# Patient Record
Sex: FEMALE | Race: WHITE
[De-identification: names, ages, dates, MRNs, and addresses within clinical notes are randomized per-mention and may not be internally consistent; named-entity substitution may affect disease eponyms.]

---

## 2019-06-15 ENCOUNTER — HOSPITAL ENCOUNTER (EMERGENCY)
Dept: HOSPITAL 91 - E/R | Age: 29
LOS: 1 days | Discharge: HOME | End: 2019-06-16
Payer: COMMERCIAL

## 2019-06-15 ENCOUNTER — HOSPITAL ENCOUNTER (EMERGENCY)
Dept: HOSPITAL 10 - E/R | Age: 29
LOS: 1 days | Discharge: HOME | End: 2019-06-16
Payer: COMMERCIAL

## 2019-06-15 VITALS
HEIGHT: 63 IN | BODY MASS INDEX: 32.03 KG/M2 | HEIGHT: 63 IN | WEIGHT: 180.78 LBS | WEIGHT: 180.78 LBS | BODY MASS INDEX: 32.03 KG/M2

## 2019-06-15 DIAGNOSIS — R55: Primary | ICD-10-CM

## 2019-06-15 LAB
ADD MAN DIFF?: NO
ANION GAP: 8 (ref 5–13)
BASOPHIL #: 0 10^3/UL (ref 0–0.1)
BASOPHILS %: 0.4 % (ref 0–2)
BLOOD UREA NITROGEN: 16 MG/DL (ref 7–20)
CALCIUM: 9.2 MG/DL (ref 8.4–10.2)
CARBON DIOXIDE: 28 MMOL/L (ref 21–31)
CHLORIDE: 104 MMOL/L (ref 97–110)
CREATININE: 0.7 MG/DL (ref 0.44–1)
EOSINOPHILS #: 0.3 10^3/UL (ref 0–0.5)
EOSINOPHILS %: 3.2 % (ref 0–7)
GLUCOSE: 85 MG/DL (ref 70–220)
HEMATOCRIT: 38.9 % (ref 37–47)
HEMOGLOBIN: 12.7 G/DL (ref 12–16)
IMMATURE GRANS #M: 0.04 10^3/UL (ref 0–0.03)
IMMATURE GRANS % (M): 0.4 % (ref 0–0.43)
LYMPHOCYTES #: 3.4 10^3/UL (ref 0.8–2.9)
LYMPHOCYTES %: 37.9 % (ref 15–51)
MEAN CORPUSCULAR HEMOGLOBIN: 28.2 PG (ref 29–33)
MEAN CORPUSCULAR HGB CONC: 32.6 G/DL (ref 32–37)
MEAN CORPUSCULAR VOLUME: 86.4 FL (ref 82–101)
MEAN PLATELET VOLUME: 11 FL (ref 7.4–10.4)
MONOCYTE #: 0.9 10^3/UL (ref 0.3–0.9)
MONOCYTES %: 9.5 % (ref 0–11)
NEUTROPHIL #: 4.4 10^3/UL (ref 1.6–7.5)
NEUTROPHILS %: 48.6 % (ref 39–77)
NUCLEATED RED BLOOD CELLS #: 0 10^3/UL (ref 0–0)
NUCLEATED RED BLOOD CELLS%: 0 /100WBC (ref 0–0)
PLATELET COUNT: 203 10^3/UL (ref 140–415)
POTASSIUM: 4 MMOL/L (ref 3.5–5.1)
RED BLOOD COUNT: 4.5 10^6/UL (ref 4.2–5.4)
RED CELL DISTRIBUTION WIDTH: 12.6 % (ref 11.5–14.5)
SODIUM: 140 MMOL/L (ref 135–144)
TROPONIN-I: < 0.012 NG/ML (ref 0–0.12)
WHITE BLOOD COUNT: 9.1 10^3/UL (ref 4.8–10.8)

## 2019-06-15 PROCEDURE — 82962 GLUCOSE BLOOD TEST: CPT

## 2019-06-15 PROCEDURE — 80048 BASIC METABOLIC PNL TOTAL CA: CPT

## 2019-06-15 PROCEDURE — 70450 CT HEAD/BRAIN W/O DYE: CPT

## 2019-06-15 PROCEDURE — 85025 COMPLETE CBC W/AUTO DIFF WBC: CPT

## 2019-06-15 PROCEDURE — 99285 EMERGENCY DEPT VISIT HI MDM: CPT

## 2019-06-15 PROCEDURE — 81025 URINE PREGNANCY TEST: CPT

## 2019-06-15 PROCEDURE — 93005 ELECTROCARDIOGRAM TRACING: CPT

## 2019-06-15 PROCEDURE — 84484 ASSAY OF TROPONIN QUANT: CPT

## 2019-06-15 PROCEDURE — 36415 COLL VENOUS BLD VENIPUNCTURE: CPT

## 2019-06-15 RX ADMIN — THIAMINE HYDROCHLORIDE 1 MLS/HR: 100 INJECTION, SOLUTION INTRAMUSCULAR; INTRAVENOUS at 22:13

## 2019-06-15 NOTE — ERD
ER Documentation


Chief Complaint


Chief Complaint





STATES SYNCOPAL EPISODE 1 HR AGO





HPI


This is a 28-year-old female who presents to the emergency room with a possible 


syncopal episode.  The patient states that she has been under a significant 


amount of stress lately.  She has been working 13-hour days and is preparing for


a wedding in a week.  The patient states that she was sitting in the car playing


on her phone and she felt her arms go limp.  It was bilateral upper extremities.


 She felt her entire body was heavy and limp and she was aware of her 


surroundings but unable to respond.  This lasted for 1-2 moments and completely 


resolved.  The patient denied any prodrome of headache chest pain or shortness 


of breath.  No slurred speech.  Patient otherwise has no complaints.  She denies


any headache.





ROS


All systems reviewed and are negative except as per history of present illness.





Allergies


Allergies:  


Coded Allergies:  


     No Known Allergy (Unverified , 6/15/19)





PMhx/Soc


Medical and Surgical Hx:  pt denies Medical Hx, pt denies Surgical Hx


Hx Alcohol Use:  No


Hx Substance Use:  No


Hx Tobacco Use:  No


Smoking Status:  Never smoker





FmHx


Family History:  No diabetes





Physical Exam


Vitals





Vital Signs


  Date      Temp  Pulse  Resp  B/P (MAP)   Pulse Ox  O2          O2 Flow    FiO2


Time                                                 Delivery    Rate


   6/15/19  98.3     85    19      111/70        99


     21:59                           (84)


   6/15/19  98.3     84    19      131/83        99


     21:03                           (99)





Physical Exam


General: Well developed, well nourished, no acute distress


Head: Normocephalic, atraumatic.


Eyes: Pupils equally reactive, EOM intact


ENT: Moist mucous membranes


Neck: Supple, no lymphadenopathy


Respiratory: Lungs clear bilaterally, no distress


Cardiovascular: RRR, no murmurs, rubs, or gallops


Abdominal: Soft, non-tender, non-distended, no peritoneal signs


: Deferred


MSK: No edema, no unilateral swelling, 5/5 strength


Neurologic: Alert and oriented, moving all extremities, normal speech, no focal 


weakness, no cerebellar signs


Skin: No rash


Psych: Normal mood


Result Diagram:  


6/15/19 2206                                                                    


           6/15/19 2208





Results 24 hrs





Laboratory Tests


Test
                6/15/19
22:06  6/15/19
22:07   6/15/19
22:08  6/15/19
22:20


White Blood Count     9.1 10^3/ul


Red Blood Count      4.50 10^6/ul


Hemoglobin              12.7 g/dl


Hematocrit                 38.9 %


Mean Corpuscular          86.4 fl


Volume


Mean Corpuscular          28.2 pg


Hemoglobin


Mean Corpuscular       32.6 g/dl 
  
              
               



Hemoglobin
Concent


Red Cell                   12.6 %


Distribution Width


Platelet Count        203 10^3/UL


Mean Platelet             11.0 fl


Volume


Immature                  0.400 %


Granulocytes %


Neutrophils %              48.6 %


Lymphocytes %              37.9 %


Monocytes %                 9.5 %


Eosinophils %               3.2 %


Basophils %                 0.4 %


Nucleated Red         0.0 /100WBC


Blood Cells %


Immature            0.040 10^3/ul


Granulocytes #


Neutrophils #         4.4 10^3/ul


Lymphocytes #         3.4 10^3/ul


Monocytes #           0.9 10^3/ul


Eosinophils #         0.3 10^3/ul


Basophils #           0.0 10^3/ul


Nucleated Red         0.0 10^3/ul


Blood Cells #


Bedside Glucose                         83 mg/dL


Sodium Level                                          140 mmol/L


Potassium Level                                       4.0 mmol/L


Chloride Level                                        104 mmol/L


Carbon Dioxide                                         28 mmol/L


Level


Anion Gap                                                      8


Blood Urea                                              16 mg/dl


Nitrogen


Creatinine                                            0.70 mg/dl


Est Glomerular      
               
              > 60 mL/min 
   



Filtrat


Rate
mL/min


Glucose Level                                           85 mg/dl


Calcium Level                                          9.2 mg/dl


Troponin I                                         < 0.012 ng/ml


POC Beta HCG,                                                      NEGATIVE


Qualitative





Current Medications


 Medications
   Dose
          Sig/Jolanta
       Start Time
   Status  Last


 (Trade)       Ordered        Route
 PRN     Stop Time              Admin
Dose


                              Reason                                Admin


 Sodium         1,000 ml @ 
   Q1H STAT
      6/15/19       DC           6/15/19


Chloride       1,000 mls/hr   IV
            21:58
                       22:13



                                             6/15/19 22:57








Procedures/MDM


EKG, MONITORS, & DIAGNOSTIC IMAGING:


CT brain: No acute process per radiologist read





EKG: I reviewed and interpreted a 12-lead EKG. 


Rhythm: Normal sinus rhythm, normal QRS and QTc, no Brugada


ST Changes: No contiguous ST segment elevations


T waves: No contiguous T wave inversions


Impression: No evidence of acute cardiac ischemia





LAB INTERPRETATION:


I reviewed the laboratory testing and it shows no evidence of acute process





MEDICAL DECISION MAKING:


Patient presents with what appears to be an episode of near syncope possibly 


secondary to overexertion, stress among others.  Patient does not exhibit any 


signs or symptoms that are concerning for serious etiology.  The patient has a 


normal EKG without evidence of electrolyte ab normality or electrical 


disturbance.  Patient has no prodrome of headache to suggest subarachnoid.  No 


chest pain or shortness of breath to suggest PE or ACS.  Patient felt her whole 


body will go limp, it was not a neurologic distribution this does not appear to 


be consistent with TIA stroke or dissection. 





ER COURSE:


* The patient's laboratory testing and diagnostic imaging is otherwise 


  unrevealing.  At this point I feel outpatient management would be appropriate.


   The patient is at low risk for serious etiology.  Return precautions were 


  discussed and understood.





CONSULTATION:


None





DISPOSITION PLAN:


The patient does not have an identifiable emergent medical condition that 


warrants inpatient hospitalization at this time.  The patient is deemed safe for


discharge with outpatient follow-up.





We discussed follow up with the patient's primary care doctor within 24 to 48 


hours as needed.  We also discussed return to the emergency room for worsening 


symptoms or worsening condition.





Outpatient referral: None required





Discharge Medications:


None required





Departure


Diagnosis:  


   Primary Impression:  


   Near syncope


Condition:  Stable


Patient Instructions:  Near Syncope, Unknown


Referrals:  


[facility]


YOU HAVE RECEIVED A MEDICAL SCREENING EXAM AND THE RESULTS INDICATE THAT YOU DO 


NOT HAVE A CONDITION THAT REQUIRES URGENT TREATMENT IN THE EMERGENCY DEPARTMENT.





FURTHER EVALUATION AND TREATMENT OF YOUR CONDITION CAN WAIT UNTIL YOU ARE SEEN 


IN YOUR DOCTORS OFFICE WITHIN THE NEXT 1-2 DAYS. IT IS YOUR RESPONSIBILITY TO 


MAKE AN APPOINTMENT FOR FOLOW-UP CARE.





IF YOU HAVE A PRIMARY DOCTOR


--you should call your primary doctor and schedule an appointment





IF YOU DO NOT HAVE A PRIMARY DOCTOR YOU CAN CALL OUR PHYSICIAN REFERRAL HOTLINE 


AT


 [phone] 





IF YOU CAN NOT AFFORD TO SEE A PHYSICIAN YOU CAN CHOSE FROM THE FOLLOWING 


[facility]





[facility] [phone] [address] [facility] [phone] [address] [facility]. [facility] [phone] [address] [facility] [phone] [address] [facility] [phone] [address] [facility]. [facility]. [phone] [address] [facility]


YOU HAVE RECEIVED A MEDICAL SCREENING EXAM AND THE RESULTS INDICATE THAT YOU DO 


NOT HAVE A CONDITION THAT REQUIRES URGENT TREATMENT IN THE EMERGENCY DEPARTMENT.





FURTHER EVALUATION AND TREATMENT OF YOUR CONDITION CAN WAIT UNTIL YOU ARE SEEN 


IN YOUR DOCTORS OFFICE WITHIN THE NEXT 1-2 DAYS. IT IS YOUR RESPONSIBILITY TO 


MAKE AN APPOINTMENT FOR FOLOW-UP CARE.





IF YOU HAVE A PRIMARY DOCTOR


--you should call your primary doctor and schedule and appointment





IF YOU DO NOT HAVE A PRIMARY DOCTOR YOU CAN CALL OUR PHYSICIAN REFERRAL HOTLINE 


AT [phone].





IF YOU CAN NOT AFFORD TO SEE A PHYSICIAN YOU CAN CHOSE FROM THE FOLLOWING [facility]


INSTITUTIONS:





[facility]


[address] [place] [address]





[facility]


[address] [place] [address]





[facility]


[address] [place] [address]





Additional Instructions:  


Call your primary care doctor TOMORROW for an appointment during the next 1 


WEEK.Tell the  that you were referred from this facility.See the doctor


sooner or return here if your  condition worsens before your appointment time.











DENISSE WEBSTER MD          Jem 15, 2019 23:50

## 2019-06-16 VITALS — SYSTOLIC BLOOD PRESSURE: 115 MMHG | RESPIRATION RATE: 20 BRPM | DIASTOLIC BLOOD PRESSURE: 72 MMHG | HEART RATE: 88 BPM
